# Patient Record
Sex: MALE | Race: WHITE | Employment: FULL TIME | URBAN - METROPOLITAN AREA
[De-identification: names, ages, dates, MRNs, and addresses within clinical notes are randomized per-mention and may not be internally consistent; named-entity substitution may affect disease eponyms.]

---

## 2017-12-23 ENCOUNTER — HOSPITAL ENCOUNTER (EMERGENCY)
Age: 42
Discharge: HOME | End: 2017-12-23
Attending: EMERGENCY MEDICINE
Payer: COMMERCIAL

## 2017-12-23 ENCOUNTER — APPOINTMENT (OUTPATIENT)
Dept: CT IMAGING | Age: 42
End: 2017-12-23
Attending: PHYSICIAN ASSISTANT
Payer: COMMERCIAL

## 2017-12-23 VITALS
DIASTOLIC BLOOD PRESSURE: 72 MMHG | SYSTOLIC BLOOD PRESSURE: 112 MMHG | TEMPERATURE: 97.5 F | RESPIRATION RATE: 16 BRPM | HEART RATE: 82 BPM | OXYGEN SATURATION: 98 %

## 2017-12-23 DIAGNOSIS — G43.809 OTHER MIGRAINE WITHOUT STATUS MIGRAINOSUS, NOT INTRACTABLE: Primary | ICD-10-CM

## 2017-12-23 DIAGNOSIS — R11.2 NON-INTRACTABLE VOMITING WITH NAUSEA, UNSPECIFIED VOMITING TYPE: ICD-10-CM

## 2017-12-23 LAB
ANION GAP SERPL CALC-SCNC: 11 MMOL/L (ref 3–18)
BASOPHILS # BLD: 0 K/UL (ref 0–0.06)
BASOPHILS NFR BLD: 1 % (ref 0–2)
BUN SERPL-MCNC: 16 MG/DL (ref 7–18)
BUN/CREAT SERPL: 16 (ref 12–20)
CALCIUM SERPL-MCNC: 7.9 MG/DL (ref 8.5–10.1)
CHLORIDE SERPL-SCNC: 112 MMOL/L (ref 100–108)
CO2 SERPL-SCNC: 22 MMOL/L (ref 21–32)
CREAT SERPL-MCNC: 1.03 MG/DL (ref 0.6–1.3)
DIFFERENTIAL METHOD BLD: NORMAL
EOSINOPHIL # BLD: 0.1 K/UL (ref 0–0.4)
EOSINOPHIL NFR BLD: 2 % (ref 0–5)
ERYTHROCYTE [DISTWIDTH] IN BLOOD BY AUTOMATED COUNT: 11.9 % (ref 11.6–14.5)
GLUCOSE SERPL-MCNC: 164 MG/DL (ref 74–99)
HCT VFR BLD AUTO: 41.4 % (ref 36–48)
HGB BLD-MCNC: 14.4 G/DL (ref 13–16)
LYMPHOCYTES # BLD: 1.9 K/UL (ref 0.9–3.6)
LYMPHOCYTES NFR BLD: 37 % (ref 21–52)
MCH RBC QN AUTO: 30.4 PG (ref 24–34)
MCHC RBC AUTO-ENTMCNC: 34.8 G/DL (ref 31–37)
MCV RBC AUTO: 87.3 FL (ref 74–97)
MONOCYTES # BLD: 0.5 K/UL (ref 0.05–1.2)
MONOCYTES NFR BLD: 10 % (ref 3–10)
NEUTS SEG # BLD: 2.6 K/UL (ref 1.8–8)
NEUTS SEG NFR BLD: 50 % (ref 40–73)
PLATELET # BLD AUTO: 178 K/UL (ref 135–420)
PMV BLD AUTO: 9.7 FL (ref 9.2–11.8)
POTASSIUM SERPL-SCNC: 3.6 MMOL/L (ref 3.5–5.5)
RBC # BLD AUTO: 4.74 M/UL (ref 4.7–5.5)
SODIUM SERPL-SCNC: 145 MMOL/L (ref 136–145)
WBC # BLD AUTO: 5.1 K/UL (ref 4.6–13.2)

## 2017-12-23 PROCEDURE — 80048 BASIC METABOLIC PNL TOTAL CA: CPT

## 2017-12-23 PROCEDURE — 96361 HYDRATE IV INFUSION ADD-ON: CPT

## 2017-12-23 PROCEDURE — 70450 CT HEAD/BRAIN W/O DYE: CPT

## 2017-12-23 PROCEDURE — 85025 COMPLETE CBC W/AUTO DIFF WBC: CPT

## 2017-12-23 PROCEDURE — 74011250636 HC RX REV CODE- 250/636

## 2017-12-23 PROCEDURE — 96374 THER/PROPH/DIAG INJ IV PUSH: CPT

## 2017-12-23 PROCEDURE — 74011250636 HC RX REV CODE- 250/636: Performed by: PHYSICIAN ASSISTANT

## 2017-12-23 PROCEDURE — 99284 EMERGENCY DEPT VISIT MOD MDM: CPT

## 2017-12-23 PROCEDURE — 96375 TX/PRO/DX INJ NEW DRUG ADDON: CPT

## 2017-12-23 RX ORDER — METOCLOPRAMIDE HYDROCHLORIDE 5 MG/ML
5 INJECTION INTRAMUSCULAR; INTRAVENOUS EVERY 6 HOURS
Status: DISCONTINUED | OUTPATIENT
Start: 2017-12-23 | End: 2017-12-23 | Stop reason: HOSPADM

## 2017-12-23 RX ORDER — ONDANSETRON 2 MG/ML
INJECTION INTRAMUSCULAR; INTRAVENOUS
Status: COMPLETED
Start: 2017-12-23 | End: 2017-12-23

## 2017-12-23 RX ORDER — BUTALBITAL, ACETAMINOPHEN AND CAFFEINE 300; 40; 50 MG/1; MG/1; MG/1
1 CAPSULE ORAL
Qty: 10 CAP | Refills: 0 | Status: SHIPPED | OUTPATIENT
Start: 2017-12-23 | End: 2018-09-17

## 2017-12-23 RX ORDER — KETOROLAC TROMETHAMINE 30 MG/ML
30 INJECTION, SOLUTION INTRAMUSCULAR; INTRAVENOUS
Status: COMPLETED | OUTPATIENT
Start: 2017-12-23 | End: 2017-12-23

## 2017-12-23 RX ORDER — DIPHENHYDRAMINE HYDROCHLORIDE 50 MG/ML
12.5 INJECTION, SOLUTION INTRAMUSCULAR; INTRAVENOUS
Status: COMPLETED | OUTPATIENT
Start: 2017-12-23 | End: 2017-12-23

## 2017-12-23 RX ORDER — HALOPERIDOL 5 MG/ML
3 INJECTION INTRAMUSCULAR
Status: COMPLETED | OUTPATIENT
Start: 2017-12-23 | End: 2017-12-23

## 2017-12-23 RX ORDER — ONDANSETRON 2 MG/ML
4 INJECTION INTRAMUSCULAR; INTRAVENOUS
Status: COMPLETED | OUTPATIENT
Start: 2017-12-23 | End: 2017-12-23

## 2017-12-23 RX ORDER — METOCLOPRAMIDE HYDROCHLORIDE 5 MG/ML
5 INJECTION INTRAMUSCULAR; INTRAVENOUS EVERY 6 HOURS
Status: DISCONTINUED | OUTPATIENT
Start: 2017-12-23 | End: 2017-12-23

## 2017-12-23 RX ORDER — PROMETHAZINE HYDROCHLORIDE 25 MG/1
25 TABLET ORAL
Qty: 12 TAB | Refills: 0 | Status: SHIPPED | OUTPATIENT
Start: 2017-12-23 | End: 2018-09-17

## 2017-12-23 RX ADMIN — ONDANSETRON 4 MG: 2 INJECTION INTRAMUSCULAR; INTRAVENOUS at 09:23

## 2017-12-23 RX ADMIN — DIPHENHYDRAMINE HYDROCHLORIDE 12.5 MG: 50 INJECTION INTRAMUSCULAR; INTRAVENOUS at 09:40

## 2017-12-23 RX ADMIN — HALOPERIDOL LACTATE 3 MG: 5 INJECTION, SOLUTION INTRAMUSCULAR at 10:46

## 2017-12-23 RX ADMIN — SODIUM CHLORIDE 1000 ML: 900 INJECTION, SOLUTION INTRAVENOUS at 09:40

## 2017-12-23 RX ADMIN — METOCLOPRAMIDE 5 MG: 5 INJECTION, SOLUTION INTRAMUSCULAR; INTRAVENOUS at 10:06

## 2017-12-23 RX ADMIN — ONDANSETRON 4 MG: 2 SOLUTION INTRAMUSCULAR; INTRAVENOUS at 09:23

## 2017-12-23 RX ADMIN — KETOROLAC TROMETHAMINE 30 MG: 30 INJECTION, SOLUTION INTRAMUSCULAR; INTRAVENOUS at 09:40

## 2017-12-23 NOTE — DISCHARGE INSTRUCTIONS
Migraine Headache: Care Instructions  Your Care Instructions  Migraines are painful, throbbing headaches that often start on one side of the head. They may cause nausea and vomiting and make you sensitive to light, sound, or smell. Without treatment, migraines can last from 4 hours to a few days. Medicines can help prevent migraines or stop them after they have started. Your doctor can help you find which ones work best for you. Follow-up care is a key part of your treatment and safety. Be sure to make and go to all appointments, and call your doctor if you are having problems. It's also a good idea to know your test results and keep a list of the medicines you take. How can you care for yourself at home? · Do not drive if you have taken a prescription pain medicine. · Rest in a quiet, dark room until your headache is gone. Close your eyes, and try to relax or go to sleep. Don't watch TV or read. · Put a cold, moist cloth or cold pack on the painful area for 10 to 20 minutes at a time. Put a thin cloth between the cold pack and your skin. · Use a warm, moist towel or a heating pad set on low to relax tight shoulder and neck muscles. · Have someone gently massage your neck and shoulders. · Take your medicines exactly as prescribed. Call your doctor if you think you are having a problem with your medicine. You will get more details on the specific medicines your doctor prescribes. · Be careful not to take pain medicine more often than the instructions allow. You could get worse or more frequent headaches when the medicine wears off. To prevent migraines  · Keep a headache diary so you can figure out what triggers your headaches. Avoiding triggers may help you prevent headaches. Record when each headache began, how long it lasted, and what the pain was like.  (Was it throbbing, aching, stabbing, or dull?) Write down any other symptoms you had with the headache, such as nausea, flashing lights or dark spots, or sensitivity to bright light or loud noise. Note if the headache occurred near your period. List anything that might have triggered the headache. Triggers may include certain foods (chocolate, cheese, wine) or odors, smoke, bright light, stress, or lack of sleep. · If your doctor has prescribed medicine for your migraines, take it as directed. You may have medicine that you take only when you get a migraine and medicine that you take all the time to help prevent migraines. ¨ If your doctor has prescribed medicine for when you get a headache, take it at the first sign of a migraine, unless your doctor has given you other instructions. ¨ If your doctor has prescribed medicine to prevent migraines, take it exactly as prescribed. Call your doctor if you think you are having a problem with your medicine. · Find healthy ways to deal with stress. Migraines are most common during or right after stressful times. Take time to relax before and after you do something that has caused a migraine in the past.  · Try to keep your muscles relaxed by keeping good posture. Check your jaw, face, neck, and shoulder muscles for tension. Try to relax them. When you sit at a desk, change positions often. And make sure to stretch for 30 seconds each hour. · Get plenty of sleep and exercise. · Eat meals on a regular schedule. Avoid foods and drinks that often trigger migraines. These include chocolate, alcohol (especially red wine and port), aspartame, monosodium glutamate (MSG), and some additives found in foods (such as hot dogs, gonsalves, cold cuts, aged cheeses, and pickled foods). · Limit caffeine. Don't drink too much coffee, tea, or soda. But don't quit caffeine suddenly. That can also give you migraines. · Do not smoke or allow others to smoke around you. If you need help quitting, talk to your doctor about stop-smoking programs and medicines. These can increase your chances of quitting for good.   · If you are taking birth control pills or hormone therapy, talk to your doctor about whether they are triggering your migraines. When should you call for help? Call 911 anytime you think you may need emergency care. For example, call if:  ? · You have signs of a stroke. These may include:  ¨ Sudden numbness, paralysis, or weakness in your face, arm, or leg, especially on only one side of your body. ¨ Sudden vision changes. ¨ Sudden trouble speaking. ¨ Sudden confusion or trouble understanding simple statements. ¨ Sudden problems with walking or balance. ¨ A sudden, severe headache that is different from past headaches. ?Call your doctor now or seek immediate medical care if:  ? · You have new or worse nausea and vomiting. ? · You have a new or higher fever. ? · Your headache gets much worse. ? Watch closely for changes in your health, and be sure to contact your doctor if:  ? · You are not getting better after 2 days (48 hours). Where can you learn more? Go to http://hiral-brian.info/. Enter V215 in the search box to learn more about \"Migraine Headache: Care Instructions. \"  Current as of: October 14, 2016  Content Version: 11.4  © 3800-7353 [employer]. Care instructions adapted under license by [employer] (which disclaims liability or warranty for this information). If you have questions about a medical condition or this instruction, always ask your healthcare professional. [place] [address] any warranty or liability for your use of this information.

## 2017-12-23 NOTE — ED PROVIDER NOTES
HPI Comments: Patient is a 39 y/o male w/ PMH HTN, kidney stones, Vit D deficiency, Migraines, who presents to the ER via EMS c/o severe headache, nausea and vomiting. Patient states he woke up around 0800 this morning with these symptoms. He has had a previous hx of migraines in the past, however states his headache today feels worse. He does admit to drinking \"some glasses of wine\" last night, which is not unusual for him. Patient denied any recent sick contacts. He denied any fevers, chills, neck stiffness, dizziness, change in vision, SOB, chest pain, abd pain, diarrhea, and has no other complaints. Patient is a 40 y.o. male presenting with migraines, nausea, and vomiting. The history is provided by the patient. Migraine    Associated symptoms include nausea and vomiting. Pertinent negatives include no fever, no palpitations, no shortness of breath, no weakness and no dizziness. Nausea    Associated symptoms include headaches and headaches. Pertinent negatives include no chills, no fever, no abdominal pain and no cough. Vomiting    Associated symptoms include headaches and headaches. Pertinent negatives include no chills, no fever, no abdominal pain and no cough.         Past Medical History:   Diagnosis Date    Acute sinusitis, unspecified     Acute upper respiratory infections of unspecified site     Asthma     Calculus of kidney     Herpes simplex without mention of complication     History of herpes genitalis     Hypertension     Hypogonadism in male     Insomnia, unspecified     Lesion of plantar nerve     Mixed hypertriglyceridemia     Morbid obesity (HCC)     Obesity     Osteoarthritis     Other malaise and fatigue     Pain in joint, lower leg     Scrotal varices     Sleep apnea     Spermatocele     Tachycardia, unspecified     Testicular hypofunction     Unspecified arthropathy, lower leg     Unspecified essential hypertension     Varicocele     small, Left    Vitamin D deficiency        Past Surgical History:   Procedure Laterality Date    HX CHOLECYSTECTOMY  2005    HX COLONOSCOPY      HX GASTRECTOMY  08/24/2015    Gastric Sleeve    HX OTHER SURGICAL  08/24/2015    EGD    HX SEPTOPLASTY  2003    HX TONSILLECTOMY  2003         Family History:   Problem Relation Age of Onset    Hypertension Mother     Heart Disease Father      Afib    Diabetes Father     Heart Disease Paternal Grandmother      Afib       Social History     Social History    Marital status: SINGLE     Spouse name: N/A    Number of children: N/A    Years of education: N/A     Occupational History          Social History Main Topics    Smoking status: Never Smoker    Smokeless tobacco: Never Used    Alcohol use 0.0 oz/week     0 Standard drinks or equivalent per week      Comment: Socially    Drug use: No    Sexual activity: Not on file     Other Topics Concern    Not on file     Social History Narrative         ALLERGIES: Morphine    Review of Systems   Constitutional: Negative for chills, fatigue and fever. HENT: Negative. Negative for sore throat. Eyes: Negative. Respiratory: Negative for cough and shortness of breath. Cardiovascular: Negative for chest pain and palpitations. Gastrointestinal: Positive for nausea and vomiting. Negative for abdominal pain. Genitourinary: Negative for dysuria. Musculoskeletal: Negative. Skin: Negative. Neurological: Positive for headaches. Negative for dizziness, weakness and light-headedness. Psychiatric/Behavioral: Negative. All other systems reviewed and are negative. Vitals:    12/23/17 0927 12/23/17 1037   BP: 112/72    Pulse: 82    Resp: 16    Temp:  97.5 °F (36.4 °C)   SpO2: 98%             Physical Exam   Constitutional: He is oriented to person, place, and time. He appears well-developed and well-nourished. He appears distressed. HENT:   Head: Normocephalic and atraumatic.  Head is without raccoon's eyes and without Haddad's sign. Nose: Nose normal.   Mouth/Throat: Uvula is midline, oropharynx is clear and moist and mucous membranes are normal.   Eyes: Conjunctivae and EOM are normal. Pupils are equal, round, and reactive to light. No scleral icterus. Neck: Normal range of motion. Neck supple. No JVD present. No tracheal deviation present. Cardiovascular: Normal rate, regular rhythm and normal heart sounds. Pulmonary/Chest: Effort normal and breath sounds normal. No respiratory distress. He has no wheezes. Abdominal: Soft. Bowel sounds are normal. He exhibits no distension. There is no tenderness. There is no rebound and no guarding. Musculoskeletal: Normal range of motion. Neurological: He is alert and oriented to person, place, and time. He has normal strength. He displays a negative Romberg sign. Gait normal. GCS eye subscore is 4. GCS verbal subscore is 5. GCS motor subscore is 6.   CN 2-12 grossly in tact; speech clear   Skin: Skin is warm and dry. He is not diaphoretic. Psychiatric: He has a normal mood and affect. Nursing note and vitals reviewed. MDM  Number of Diagnoses or Management Options  Non-intractable vomiting with nausea, unspecified vomiting type: Other migraine without status migrainosus, not intractable:   Diagnosis management comments: 12:09 PM  41 y/o male brought in by EMS for headache, N/V onset this morning when he woke up. Previous hx of migraines; also admits to drinking alcohol last night. NVI intact. Stroke screen negative. Pt with persistent vomiting after zofran and reglan. Much improved with haldol. Pt resting at this time. Labs reviewed. Noted to have decreased calcium. CT head negative. Pt states feeling somewhat better. Will plan on recheck and anticipate discharge. Christian Mittal PA-C    1:13 PM  Pt feeling better after fluids, meds. Nausea/vomiting noted to improve. Discussed all results with pt and family member at bedside.   Pt stable for discharge at this time. All questions answered and patient in agreement with plan of care. Will plan for discharge. Katie Rodriguez PA-C      Clinical Impression:  MIgraine, N/V         Amount and/or Complexity of Data Reviewed  Clinical lab tests: ordered and reviewed  Tests in the radiology section of CPT®: reviewed and ordered    Risk of Complications, Morbidity, and/or Mortality  Presenting problems: moderate  Diagnostic procedures: moderate  Management options: moderate    Patient Progress  Patient progress: stable    ED Course       Procedures        Vitals:  Patient Vitals for the past 12 hrs:   Temp Pulse Resp BP SpO2   12/23/17 1037 97.5 °F (36.4 °C) - - - -   12/23/17 0927 - 82 16 112/72 98 %         Medications ordered:   Medications   metoclopramide HCl (REGLAN) injection 5 mg (5 mg IntraVENous Given 12/23/17 1006)   ketorolac (TORADOL) injection 30 mg (30 mg IntraVENous Given 12/23/17 0940)   diphenhydrAMINE (BENADRYL) injection 12.5 mg (12.5 mg IntraVENous Given 12/23/17 0940)   ondansetron (ZOFRAN) injection 4 mg (4 mg IntraVENous Given 12/23/17 0923)   sodium chloride 0.9 % bolus infusion 1,000 mL (1,000 mL IntraVENous New Bag 12/23/17 0940)   haloperidol lactate (HALDOL) injection 3 mg (3 mg IntraVENous Given 12/23/17 1046)         Lab findings:  Recent Results (from the past 12 hour(s))   CBC WITH AUTOMATED DIFF    Collection Time: 12/23/17  9:40 AM   Result Value Ref Range    WBC 5.1 4.6 - 13.2 K/uL    RBC 4.74 4.70 - 5.50 M/uL    HGB 14.4 13.0 - 16.0 g/dL    HCT 41.4 36.0 - 48.0 %    MCV 87.3 74.0 - 97.0 FL    MCH 30.4 24.0 - 34.0 PG    MCHC 34.8 31.0 - 37.0 g/dL    RDW 11.9 11.6 - 14.5 %    PLATELET 846 490 - 241 K/uL    MPV 9.7 9.2 - 11.8 FL    NEUTROPHILS 50 40 - 73 %    LYMPHOCYTES 37 21 - 52 %    MONOCYTES 10 3 - 10 %    EOSINOPHILS 2 0 - 5 %    BASOPHILS 1 0 - 2 %    ABS. NEUTROPHILS 2.6 1.8 - 8.0 K/UL    ABS. LYMPHOCYTES 1.9 0.9 - 3.6 K/UL    ABS. MONOCYTES 0.5 0.05 - 1.2 K/UL    ABS. EOSINOPHILS 0.1 0.0 - 0.4 K/UL    ABS. BASOPHILS 0.0 0.0 - 0.06 K/UL    DF AUTOMATED     METABOLIC PANEL, BASIC    Collection Time: 12/23/17  9:40 AM   Result Value Ref Range    Sodium 145 136 - 145 mmol/L    Potassium 3.6 3.5 - 5.5 mmol/L    Chloride 112 (H) 100 - 108 mmol/L    CO2 22 21 - 32 mmol/L    Anion gap 11 3.0 - 18 mmol/L    Glucose 164 (H) 74 - 99 mg/dL    BUN 16 7.0 - 18 MG/DL    Creatinine 1.03 0.6 - 1.3 MG/DL    BUN/Creatinine ratio 16 12 - 20      GFR est AA >60 >60 ml/min/1.73m2    GFR est non-AA >60 >60 ml/min/1.73m2    Calcium 7.9 (L) 8.5 - 10.1 MG/DL       EKG interpretation by ED Physician:      X-Ray, CT or other radiology findings or impressions:  CT HEAD WO CONT   Final Result          Progress notes, Consult notes or additional Procedure notes:       Reevaluation of patient:       Disposition:  Diagnosis:   1. Other migraine without status migrainosus, not intractable    2. Non-intractable vomiting with nausea, unspecified vomiting type        Disposition: Discharged    Follow-up Information     Follow up With Details Comments Contact Info    [facility] EM[facility]ENC[facility]PT  If symptoms worsen [address] [place] [address] [address] [facility]    Michael Courtney MD Call in 1 week As needed for ER follow up [facility]  [phone]             Patient's Medications   Start Taking    BUTALBITAL-ACETAMINOPHEN-CAFF (FIORICET) -40 MG PER CAPSULE    Take 1 Cap by mouth every four (4) hours as needed for Headache. Max Daily Amount: 6 Caps. Indications: Migraine    PROMETHAZINE (PHENERGAN) 25 MG TABLET    Take 1 Tab by mouth every eight (8) hours as needed. Continue Taking    ASPIRIN DELAYED-RELEASE 81 MG TABLET    Take  by mouth daily. CITALOPRAM (CELEXA) 20 MG TABLET    Take  by mouth daily. CLOMIPHENE (CLOMID) 50 MG TABLET    Take 0.5 Tabs by mouth every other day. FEXOFENADINE (ALLEGRA) 60 MG TABLET    Take  by mouth daily.     KRILL/OM3/DHA/EPA/OM6/LIP/ASTX (KRILL OIL, OMEGA 3 & 6, PO)    Take  by mouth.    These Medications have changed    No medications on file   Stop Taking    No medications on file

## 2017-12-23 NOTE — ED TRIAGE NOTES
Pt brought via medic from home for migraine, accompanied w/ N&V. Pt has a HX of migraines. Pt states he did drink some wine last night. Pt currently having sensitivty to light. Pt denies SOB and Chest Pain.

## 2018-01-18 ENCOUNTER — HOSPITAL ENCOUNTER (OUTPATIENT)
Dept: LAB | Age: 43
Discharge: HOME | End: 2018-01-18
Payer: COMMERCIAL

## 2018-01-18 LAB
ASPIRIN TEST, ASPIRN: 539 ARU (ref 620–672)
P2Y12 PLT RESPONSE,PPPR: 234 PRU (ref 194–418)

## 2018-01-18 PROCEDURE — 85576 BLOOD PLATELET AGGREGATION: CPT | Performed by: PSYCHIATRY & NEUROLOGY

## 2018-01-18 PROCEDURE — 36415 COLL VENOUS BLD VENIPUNCTURE: CPT | Performed by: PSYCHIATRY & NEUROLOGY

## 2024-12-23 ENCOUNTER — HOSPITAL ENCOUNTER (OUTPATIENT)
Facility: HOSPITAL | Age: 49
Setting detail: RECURRING SERIES
Discharge: HOME | End: 2024-12-26
Payer: COMMERCIAL

## 2024-12-23 PROCEDURE — 97161 PT EVAL LOW COMPLEX 20 MIN: CPT

## 2024-12-23 NOTE — PROGRESS NOTES
PHYSICAL / OCCUPATIONAL THERAPY - DAILY TREATMENT NOTE    Patient Name: Benjamin Jacobson    Date: 2024    : 1975  Insurance: Payor: [place] / Plan: [place] / Product Type: *No Product type* /      Patient  verified Yes     Visit #   Current / Total 1 10   Time   In / Out 1130 1210   Pain   In / Out 0 0   Subjective Functional Status/Changes: See POC     TREATMENT AREA =  Pain in left shoulder [M25.512]    OBJECTIVE    40 min [x]Eval - untimed                             Therapeutic Procedures:    Tx Min Billable or 1:1 Min (if diff from Tx Min) Procedure, Rationale, Specifics          Details if applicable:              Details if applicable:            Details if applicable:            Details if applicable:            Details if applicable:       [facility] Totals Reminder: bill using total billable min of TIMED therapeutic procedures (example: do not include dry needle or estim unattended, both untimed codes, in totals to left)  8-22 min = 1 unit; 23-37 min = 2 units; 38-52 min = 3 units; 53-67 min = 4 units; 68-82 min = 5 units   Total Total     [x]  Patient Education billed concurrently with other procedures   [x] Review HEP    [] Progressed/Changed HEP, detail:    [] Other detail:       Objective Information/Functional Measures/Assessment    See POC    Patient will continue to benefit from skilled PT / OT services to modify and progress therapeutic interventions, analyze and address functional mobility deficits, analyze and address ROM deficits, analyze and address strength deficits, analyze and address soft tissue restrictions, analyze and cue for proper movement patterns, analyze and modify for postural abnormalities, analyze and address imbalance/dizziness, and instruct in home and community integration to address functional deficits and attain remaining goals.    Progress toward goals / Updated goals:  []  See Progress Note/Recertification    See POC    Next PN/ RC due 25  Auth 
home-management.  Eval: 87 degrees pain     5. Pt will improve functional IR to at least T8 to improve their ability to reach behind their back for ADLs/IADLs.    Eval: L1      Frequency / Duration: Patient would benefit from skilled PT 1-2 times per week for 24 VISITS sessions as needed in this certification period.  Goals will be assigned and reassessed every 10 visits/ 30 days per Medicare guidelines    Patient/ Caregiver education and instruction: Diagnosis, prognosis, self care, activity modification, and exercises [x]  Plan of care has been reviewed with PTA    Certification Period: 12/23/24 - 1/21/25    Harrison Quan, PT       12/23/2024       11:27 AM  ===================================================================  I certify that the above Therapy Services are being furnished while the patient is under my care. I agree with the treatment plan and certify that this therapy is necessary.    Physician's Signature:_________________________   DATE:_________   TIME:________                           Damien Rachel MD    ** Signature, Date and Time must be completed for valid certification **  Please sign and return to [facility] [address]  [address]  [place] [address] [phone] fax

## 2025-01-03 ENCOUNTER — APPOINTMENT (OUTPATIENT)
Facility: HOSPITAL | Age: 50
End: 2025-01-03
Payer: COMMERCIAL

## 2025-01-06 ENCOUNTER — HOSPITAL ENCOUNTER (OUTPATIENT)
Facility: HOSPITAL | Age: 50
Setting detail: RECURRING SERIES
Discharge: HOME | End: 2025-01-09
Payer: COMMERCIAL

## 2025-01-06 PROCEDURE — 97112 NEUROMUSCULAR REEDUCATION: CPT

## 2025-01-06 PROCEDURE — 97530 THERAPEUTIC ACTIVITIES: CPT

## 2025-01-06 PROCEDURE — 97110 THERAPEUTIC EXERCISES: CPT

## 2025-01-06 NOTE — PROGRESS NOTES
PHYSICAL / OCCUPATIONAL THERAPY - DAILY TREATMENT NOTE    Patient Name: Benjamin Jacobson    Date: 2025    : 1975  Insurance: Payor: [place] / Plan: [employer] [place] [employer] / Product Type: *No Product type* /      Patient  verified Yes     Visit #   Current / Total 2 10   Time   In / Out 1135 1213   Pain   In / Out 0 0   Subjective Functional Status/Changes: Patient reports up until last night the pain has been pretty much gone but he slept on it wrong.     TREATMENT AREA =  Pain in left shoulder [M25.512]     OBJECTIVE      Therapeutic Procedures:    Tx Min Billable or 1:1 Min (if diff from Tx Min) Procedure, Rationale, Specifics   14  55399 Therapeutic Exercise (timed):  increase ROM, strength, coordination, balance, and proprioception to improve patient's ability to progress to PLOF and address remaining functional goals. (see flow sheet as applicable)     Details if applicable:       10  83274 Neuromuscular Re-Education (timed):  improve balance, coordination, kinesthetic sense, posture, core stability and proprioception to improve patient's ability to develop conscious control of individual muscles and awareness of position of extremities in order to progress to PLOF and address remaining functional goals. (see flow sheet as applicable)     Details if applicable:  scapular re-ed   14  67535 Therapeutic Activity (timed):  use of dynamic activities replicating functional movements to increase ROM, strength, coordination, balance, and proprioception in order to improve patient's ability to progress to PLOF and address remaining functional goals.  (see flow sheet as applicable)     Details if applicable:  functional reaching   38  [facility] Totals Reminder: bill using total billable min of TIMED therapeutic procedures (example: do not include dry needle or estim unattended, both untimed codes, in totals to left)  8-22 min = 1 unit; 23-37 min = 2 units; 38-52 min = 3 units; 53-67 min = 4 units; 68-82 min = 5

## 2025-01-08 ENCOUNTER — HOSPITAL ENCOUNTER (OUTPATIENT)
Facility: HOSPITAL | Age: 50
Setting detail: RECURRING SERIES
End: 2025-01-08
Payer: COMMERCIAL

## 2025-01-10 ENCOUNTER — HOSPITAL ENCOUNTER (OUTPATIENT)
Facility: HOSPITAL | Age: 50
Setting detail: RECURRING SERIES
Discharge: HOME | End: 2025-01-13
Payer: COMMERCIAL

## 2025-01-10 PROCEDURE — 97110 THERAPEUTIC EXERCISES: CPT

## 2025-01-10 PROCEDURE — 97112 NEUROMUSCULAR REEDUCATION: CPT

## 2025-01-10 NOTE — PROGRESS NOTES
ADLs/IADLs.               Eval: L1  Current: 1/10/2025, FIR to T11      Next PN/ RC due 01/21/25  Auth due (visit number/ date) TOMAS      PLAN  - Continue Plan of Care    PRAVIN CANNON PTA    1/10/2025    8:49 AM  If an interpreting service was utilized for treatment of this patient, the contents of this document represent the material reviewed with the patient via the .     Future Appointments   Date Time Provider Department Center   1/10/2025 12:10 PM Pravin Cannon PTA [facility]   1/13/2025 10:10 AM Anyi Noriega, PT [facility]   1/15/2025  2:10 [facility]   1/20/2025  1:30 PM Anyi Noriega, PT [facility]

## 2025-01-13 ENCOUNTER — HOSPITAL ENCOUNTER (OUTPATIENT)
Facility: HOSPITAL | Age: 50
Setting detail: RECURRING SERIES
Discharge: HOME | End: 2025-01-16
Payer: COMMERCIAL

## 2025-01-13 PROCEDURE — 97530 THERAPEUTIC ACTIVITIES: CPT

## 2025-01-13 PROCEDURE — 97112 NEUROMUSCULAR REEDUCATION: CPT

## 2025-01-13 PROCEDURE — 97110 THERAPEUTIC EXERCISES: CPT

## 2025-01-13 NOTE — PROGRESS NOTES
Details if applicable:  functional reaching    38 38 [facility] Totals Reminder: bill using total billable min of TIMED therapeutic procedures (example: do not include dry needle or estim unattended, both untimed codes, in totals to left)  8-22 min = 1 unit; 23-37 min = 2 units; 38-52 min = 3 units; 53-67 min = 4 units; 68-82 min = 5 units   Total Total     [x]  Patient Education billed concurrently with other procedures   [x] Review HEP    [] Progressed/Changed HEP, detail:    [] Other detail:       Objective Information/Functional Measures/Assessment    Patient participated in today's session without adverse effect. Remains limited in AROM in L vs RUE, but progressing in flex AROM since IE. Tactile cuing for scap retraction with row/ext for scapular mm recruitment. Reports post L shoulder pain with doorway stretch and no stretch through pec mm/chest; held 2/2 pain, but noted limited mobility into ER/horiz abd at 90. Noted limitations in thoracic mobility with extensions and open books; may consider T/s PA in future sessions as appropriate. Progress as able.       Patient will continue to benefit from skilled PT / OT services to modify and progress therapeutic interventions, analyze and address functional mobility deficits, analyze and address ROM deficits, analyze and address strength deficits, analyze and address soft tissue restrictions, analyze and cue for proper movement patterns, analyze and modify for postural abnormalities, and instruct in home and community integration to address functional deficits and attain remaining goals.    Progress toward goals / Updated goals:  []  See Progress Note/Recertification    Short Term Goals: To be accomplished in 2 weeks  Pt will be able to report a 5/10 pain rating at worst to improve patient's ability to tolerate prolonged functional activities at home. (Initial STG)              Eval:7/10 at worst prior to injection               Reports he has been mostly pain free

## 2025-01-15 ENCOUNTER — HOSPITAL ENCOUNTER (OUTPATIENT)
Facility: HOSPITAL | Age: 50
Setting detail: RECURRING SERIES
Discharge: HOME | End: 2025-01-18
Payer: COMMERCIAL

## 2025-01-15 PROCEDURE — 97112 NEUROMUSCULAR REEDUCATION: CPT

## 2025-01-15 PROCEDURE — 97530 THERAPEUTIC ACTIVITIES: CPT

## 2025-01-15 PROCEDURE — 97110 THERAPEUTIC EXERCISES: CPT

## 2025-01-15 NOTE — PROGRESS NOTES
PHYSICAL / OCCUPATIONAL THERAPY - DAILY TREATMENT NOTE    Patient Name: Benjamin Jacobson    Date: 1/15/2025    : 1975  Insurance: Payor: [place] / Plan: [place] / Product Type: *No Product type* /      Patient  verified Yes     Visit #   Current / Total 5 10   Time   In / Out 2:16 2:54   Pain   In / Out 0 0   Subjective Functional Status/Changes: Pt states he feels his ROM is better since starting therapy.      TREATMENT AREA =  Pain in left shoulder [M25.512]     OBJECTIVE  Therapeutic Procedures:    Tx Min Billable or 1:1 Min (if diff from Tx Min) Procedure, Rationale, Specifics   10  68047 Therapeutic Exercise (timed):  increase ROM, strength, coordination, balance, and proprioception to improve patient's ability to progress to PLOF and address remaining functional goals. (see flow sheet as applicable)     Details if applicable:       20  45607 Neuromuscular Re-Education (timed):  improve balance, coordination, kinesthetic sense, posture, core stability and proprioception to improve patient's ability to develop conscious control of individual muscles and awareness of position of extremities in order to progress to PLOF and address remaining functional goals. (see flow sheet as applicable)     Details if applicable:    8  78988 Therapeutic Activity (timed):  use of dynamic activities replicating functional movements to increase ROM, strength, coordination, balance, and proprioception in order to improve patient's ability to progress to PLOF and address remaining functional goals.  (see flow sheet as applicable)     Details if applicable:   38  [facility] Totals Reminder: bill using total billable min of TIMED therapeutic procedures (example: do not include dry needle or estim unattended, both untimed codes, in totals to left)  8-22 min = 1 unit; 23-37 min = 2 units; 38-52 min = 3 units; 53-67 min = 4 units; 68-82 min = 5 units   Total Total     [x]  Patient Education billed concurrently with other

## 2025-01-20 ENCOUNTER — HOSPITAL ENCOUNTER (OUTPATIENT)
Facility: HOSPITAL | Age: 50
Setting detail: RECURRING SERIES
Discharge: HOME | End: 2025-01-23
Payer: COMMERCIAL

## 2025-01-20 PROCEDURE — 97530 THERAPEUTIC ACTIVITIES: CPT

## 2025-01-20 PROCEDURE — 97110 THERAPEUTIC EXERCISES: CPT

## 2025-01-20 NOTE — PROGRESS NOTES
Physical Therapy Discharge Instructions      [facility] [address]  [address]  [place] [address]  [phone] fax      Patient: Benjamin Jacobson  : 1975      Continue Home Exercise Program 1-2 times per day for 3-4 weeks, then decrease to 3-4 times per week      Continue with    [x] Ice  as needed 2-3 times per day     [] Heat           Follow up with MD:     [] Upon completion of therapy     [x] As needed      Recommendations:     [x]   Return to activity with home program    []   Return to activity with the following modifications:       []Post Rehab Program    []Join Independent aquatic program     []Return to/join local gym        Additional Comments: Let us know if you have any questions!          Anyi Noriega, PT 2025 1:53 PM    
Pt will improve QuickDASH score to 15 to demonstrate improvement in patient's ability to perform unrestricted ADLs/IADLs at home & community. (Initial LTG)              Eval: 25 points              MET: 14 points, 7%      3. Pt will report compliance with home HEP at end of care to enhance carry over of fuctional gains made with skilled therapy services in order to improve quality of life. (Initial LTG)              Eval: Established first HEP at eval              MET: reports compliance, updated for d/c      4. Pt will improve left abduction AROM to at least 107 degrees to improve their ability to perform to reach out to the side for home-management.  Eval: 87 degrees pain   Current: MET, 137 degs with tightness and pain at the end range 1/15/2025              MET: 167 deg (01/20/25)      5. Pt will improve functional IR to at least T8 to improve their ability to reach behind their back for ADLs/IADLs.               Eval: L1  Current: 1/10/2025, FIR to T11              MET: T8 (01/20/25)     Next PN/ RC due NA, D/C  Auth due (visit number/ date) TOMAS    PLAN  - Discharge due to : PROGRAM COMPLETE    Anyi Noriega, PT    1/20/2025    1:33 PM  If an interpreting service was utilized for treatment of this patient, the contents of this document represent the material reviewed with the patient via the .     No future appointments.      
continue treatment with updated order from MD.      If you have any questions/comments please contact us directly at [phone].   Thank you for allowing us to assist in the care of your patient.  PTA signature  Anyi Noriega PT     Therapist Signature: Anyi Noriega PT Date: 01/20/25   Reporting Period: 12/23/24 - 01/20/25 Time: 2:00 PM

## 2025-03-23 ENCOUNTER — APPOINTMENT (OUTPATIENT)
Facility: HOSPITAL | Age: 50
DRG: 149 | End: 2025-03-23
Payer: COMMERCIAL

## 2025-03-23 ENCOUNTER — HOSPITAL ENCOUNTER (INPATIENT)
Facility: HOSPITAL | Age: 50
LOS: 1 days | Discharge: HOME | DRG: 149 | End: 2025-03-24
Attending: EMERGENCY MEDICINE | Admitting: INTERNAL MEDICINE
Payer: COMMERCIAL

## 2025-03-23 DIAGNOSIS — R42 VERTIGO: Primary | ICD-10-CM

## 2025-03-23 DIAGNOSIS — R09.89 SUSPECTED CEREBROVASCULAR ACCIDENT (CVA): ICD-10-CM

## 2025-03-23 DIAGNOSIS — I63.9 CEREBROVASCULAR ACCIDENT (CVA), UNSPECIFIED MECHANISM (HCC): ICD-10-CM

## 2025-03-23 LAB
ALBUMIN SERPL-MCNC: 3.8 G/DL (ref 3.4–5)
ALBUMIN/GLOB SERPL: 1.5 (ref 0.8–1.7)
ALP SERPL-CCNC: 73 U/L (ref 45–117)
ALT SERPL-CCNC: 24 U/L (ref 16–61)
ANION GAP SERPL CALC-SCNC: 5 MMOL/L (ref 3–18)
APPEARANCE UR: CLEAR
AST SERPL-CCNC: 20 U/L (ref 10–38)
BASOPHILS # BLD: 0.05 K/UL (ref 0–0.1)
BASOPHILS NFR BLD: 1 % (ref 0–2)
BILIRUB SERPL-MCNC: 0.3 MG/DL (ref 0.2–1)
BILIRUB UR QL: NEGATIVE
BUN SERPL-MCNC: 19 MG/DL (ref 7–18)
BUN/CREAT SERPL: 23 (ref 12–20)
CALCIUM SERPL-MCNC: 8.3 MG/DL (ref 8.5–10.1)
CHLORIDE SERPL-SCNC: 107 MMOL/L (ref 100–111)
CO2 SERPL-SCNC: 29 MMOL/L (ref 21–32)
COLOR UR: YELLOW
CREAT SERPL-MCNC: 0.82 MG/DL (ref 0.6–1.3)
DIFFERENTIAL METHOD BLD: ABNORMAL
EOSINOPHIL # BLD: 0.23 K/UL (ref 0–0.4)
EOSINOPHIL NFR BLD: 4.5 % (ref 0–5)
ERYTHROCYTE [DISTWIDTH] IN BLOOD BY AUTOMATED COUNT: 11.4 % (ref 11.6–14.5)
FLUAV RNA SPEC QL NAA+PROBE: NOT DETECTED
FLUBV RNA SPEC QL NAA+PROBE: NOT DETECTED
GLOBULIN SER CALC-MCNC: 2.5 G/DL (ref 2–4)
GLUCOSE BLD STRIP.AUTO-MCNC: 88 MG/DL (ref 70–110)
GLUCOSE SERPL-MCNC: 77 MG/DL (ref 74–99)
GLUCOSE UR STRIP.AUTO-MCNC: NEGATIVE MG/DL
HCT VFR BLD AUTO: 41.9 % (ref 36–48)
HGB BLD-MCNC: 14.3 G/DL (ref 13–16)
HGB UR QL STRIP: NEGATIVE
IMM GRANULOCYTES # BLD AUTO: 0.02 K/UL (ref 0–0.04)
IMM GRANULOCYTES NFR BLD AUTO: 0.4 % (ref 0–0.5)
INR PPP: 1 (ref 0.9–1.1)
KETONES UR QL STRIP.AUTO: ABNORMAL MG/DL
LEUKOCYTE ESTERASE UR QL STRIP.AUTO: NEGATIVE
LYMPHOCYTES # BLD: 1.95 K/UL (ref 0.9–3.6)
LYMPHOCYTES NFR BLD: 37.9 % (ref 21–52)
MCH RBC QN AUTO: 30.4 PG (ref 24–34)
MCHC RBC AUTO-ENTMCNC: 34.1 G/DL (ref 31–37)
MCV RBC AUTO: 89 FL (ref 78–100)
MONOCYTES # BLD: 0.56 K/UL (ref 0.05–1.2)
MONOCYTES NFR BLD: 10.9 % (ref 3–10)
NEUTS SEG # BLD: 2.33 K/UL (ref 1.8–8)
NEUTS SEG NFR BLD: 45.3 % (ref 40–73)
NITRITE UR QL STRIP.AUTO: NEGATIVE
NRBC # BLD: 0 K/UL (ref 0–0.01)
NRBC BLD-RTO: 0 PER 100 WBC
NT PRO BNP: 17 PG/ML (ref 0–450)
PH UR STRIP: 6.5 (ref 5–8)
PLATELET # BLD AUTO: 189 K/UL (ref 135–420)
PMV BLD AUTO: 9.2 FL (ref 9.2–11.8)
POTASSIUM SERPL-SCNC: 3.9 MMOL/L (ref 3.5–5.5)
PROT SERPL-MCNC: 6.3 G/DL (ref 6.4–8.2)
PROT UR STRIP-MCNC: NEGATIVE MG/DL
PROTHROMBIN TIME: 13.1 SEC (ref 11.9–14.9)
RBC # BLD AUTO: 4.71 M/UL (ref 4.35–5.65)
SARS-COV-2 RNA RESP QL NAA+PROBE: NOT DETECTED
SODIUM SERPL-SCNC: 141 MMOL/L (ref 136–145)
SOURCE: NORMAL
SP GR UR REFRACTOMETRY: 1.03 (ref 1–1.03)
TROPONIN I SERPL HS-MCNC: 6 NG/L (ref 0–78)
UROBILINOGEN UR QL STRIP.AUTO: 0.2 EU/DL (ref 0.2–1)
WBC # BLD AUTO: 5.1 K/UL (ref 4.6–13.2)

## 2025-03-23 PROCEDURE — 83880 ASSAY OF NATRIURETIC PEPTIDE: CPT

## 2025-03-23 PROCEDURE — 85610 PROTHROMBIN TIME: CPT

## 2025-03-23 PROCEDURE — 85025 COMPLETE CBC W/AUTO DIFF WBC: CPT

## 2025-03-23 PROCEDURE — 93005 ELECTROCARDIOGRAM TRACING: CPT | Performed by: EMERGENCY MEDICINE

## 2025-03-23 PROCEDURE — 70496 CT ANGIOGRAPHY HEAD: CPT

## 2025-03-23 PROCEDURE — 87636 SARSCOV2 & INF A&B AMP PRB: CPT

## 2025-03-23 PROCEDURE — 82962 GLUCOSE BLOOD TEST: CPT

## 2025-03-23 PROCEDURE — 6360000004 HC RX CONTRAST MEDICATION: Performed by: EMERGENCY MEDICINE

## 2025-03-23 PROCEDURE — 70450 CT HEAD/BRAIN W/O DYE: CPT

## 2025-03-23 PROCEDURE — 99285 EMERGENCY DEPT VISIT HI MDM: CPT

## 2025-03-23 PROCEDURE — 84484 ASSAY OF TROPONIN QUANT: CPT

## 2025-03-23 PROCEDURE — 80053 COMPREHEN METABOLIC PANEL: CPT

## 2025-03-23 PROCEDURE — 81003 URINALYSIS AUTO W/O SCOPE: CPT

## 2025-03-23 RX ORDER — IOPAMIDOL 755 MG/ML
100 INJECTION, SOLUTION INTRAVASCULAR
Status: COMPLETED | OUTPATIENT
Start: 2025-03-23 | End: 2025-03-23

## 2025-03-23 RX ADMIN — IOPAMIDOL 100 ML: 755 INJECTION, SOLUTION INTRAVENOUS at 21:51

## 2025-03-23 ASSESSMENT — LIFESTYLE VARIABLES
HOW MANY STANDARD DRINKS CONTAINING ALCOHOL DO YOU HAVE ON A TYPICAL DAY: 1 OR 2
HOW OFTEN DO YOU HAVE A DRINK CONTAINING ALCOHOL: 2-4 TIMES A MONTH

## 2025-03-23 ASSESSMENT — PAIN - FUNCTIONAL ASSESSMENT: PAIN_FUNCTIONAL_ASSESSMENT: NONE - DENIES PAIN

## 2025-03-24 ENCOUNTER — APPOINTMENT (OUTPATIENT)
Facility: HOSPITAL | Age: 50
DRG: 149 | End: 2025-03-24
Attending: INTERNAL MEDICINE
Payer: COMMERCIAL

## 2025-03-24 ENCOUNTER — APPOINTMENT (OUTPATIENT)
Facility: HOSPITAL | Age: 50
DRG: 149 | End: 2025-03-24
Payer: COMMERCIAL

## 2025-03-24 VITALS
OXYGEN SATURATION: 98 % | HEIGHT: 69 IN | WEIGHT: 185 LBS | DIASTOLIC BLOOD PRESSURE: 79 MMHG | TEMPERATURE: 98.1 F | HEART RATE: 84 BPM | SYSTOLIC BLOOD PRESSURE: 111 MMHG | RESPIRATION RATE: 19 BRPM | BODY MASS INDEX: 27.4 KG/M2

## 2025-03-24 PROBLEM — R09.89 SUSPECTED CEREBROVASCULAR ACCIDENT (CVA): Status: ACTIVE | Noted: 2025-03-24

## 2025-03-24 LAB
ALBUMIN SERPL-MCNC: 3.4 G/DL (ref 3.4–5)
ALBUMIN/GLOB SERPL: 1.4 (ref 0.8–1.7)
ALP SERPL-CCNC: 63 U/L (ref 45–117)
ALT SERPL-CCNC: 22 U/L (ref 16–61)
ANION GAP SERPL CALC-SCNC: 4 MMOL/L (ref 3–18)
AST SERPL-CCNC: 20 U/L (ref 10–38)
BASOPHILS # BLD: 0.03 K/UL (ref 0–0.1)
BASOPHILS NFR BLD: 0.9 % (ref 0–2)
BILIRUB SERPL-MCNC: 0.5 MG/DL (ref 0.2–1)
BUN SERPL-MCNC: 17 MG/DL (ref 7–18)
BUN/CREAT SERPL: 22 (ref 12–20)
CALCIUM SERPL-MCNC: 8.4 MG/DL (ref 8.5–10.1)
CHLORIDE SERPL-SCNC: 107 MMOL/L (ref 100–111)
CHOLEST SERPL-MCNC: 143 MG/DL
CO2 SERPL-SCNC: 27 MMOL/L (ref 21–32)
CREAT SERPL-MCNC: 0.78 MG/DL (ref 0.6–1.3)
DIFFERENTIAL METHOD BLD: ABNORMAL
ECHO AO ASC DIAM: 3 CM
ECHO AO ASCENDING AORTA INDEX: 1.5 CM/M2
ECHO AO ROOT DIAM: 3.1 CM
ECHO AO ROOT INDEX: 1.55 CM/M2
ECHO AV AREA PEAK VELOCITY: 3.1 CM2
ECHO AV AREA VTI: 3 CM2
ECHO AV AREA/BSA PEAK VELOCITY: 1.6 CM2/M2
ECHO AV AREA/BSA VTI: 1.5 CM2/M2
ECHO AV MEAN GRADIENT: 3 MMHG
ECHO AV MEAN VELOCITY: 0.8 M/S
ECHO AV PEAK GRADIENT: 6 MMHG
ECHO AV PEAK VELOCITY: 1.2 M/S
ECHO AV VELOCITY RATIO: 0.92
ECHO AV VTI: 21.6 CM
ECHO BSA: 2.02 M2
ECHO LA VOL A-L A2C: 27 ML (ref 18–58)
ECHO LA VOL A-L A4C: 32 ML (ref 18–58)
ECHO LA VOL MOD A2C: 26 ML (ref 18–58)
ECHO LA VOL MOD A4C: 29 ML (ref 18–58)
ECHO LA VOLUME AREA LENGTH: 30 ML
ECHO LA VOLUME INDEX A-L A2C: 14 ML/M2 (ref 16–34)
ECHO LA VOLUME INDEX A-L A4C: 16 ML/M2 (ref 16–34)
ECHO LA VOLUME INDEX AREA LENGTH: 15 ML/M2 (ref 16–34)
ECHO LA VOLUME INDEX MOD A2C: 13 ML/M2 (ref 16–34)
ECHO LA VOLUME INDEX MOD A4C: 15 ML/M2 (ref 16–34)
ECHO LV E' LATERAL VELOCITY: 13 CM/S
ECHO LV E' SEPTAL VELOCITY: 9 CM/S
ECHO LV EDV A2C: 87 ML
ECHO LV EDV A4C: 76 ML
ECHO LV EDV BP: 81 ML (ref 67–155)
ECHO LV EDV INDEX A4C: 38 ML/M2
ECHO LV EDV INDEX BP: 41 ML/M2
ECHO LV EDV NDEX A2C: 44 ML/M2
ECHO LV EF PHYSICIAN: 65 %
ECHO LV EJECTION FRACTION A2C: 68 %
ECHO LV EJECTION FRACTION A4C: 65 %
ECHO LV EJECTION FRACTION BIPLANE: 67 % (ref 55–100)
ECHO LV ESV A2C: 28 ML
ECHO LV ESV A4C: 26 ML
ECHO LV ESV BP: 27 ML (ref 22–58)
ECHO LV ESV INDEX A2C: 14 ML/M2
ECHO LV ESV INDEX A4C: 13 ML/M2
ECHO LV ESV INDEX BP: 14 ML/M2
ECHO LV FRACTIONAL SHORTENING: 32 % (ref 28–44)
ECHO LV INTERNAL DIMENSION DIASTOLE INDEX: 2.5 CM/M2
ECHO LV INTERNAL DIMENSION DIASTOLIC: 5 CM (ref 4.2–5.9)
ECHO LV INTERNAL DIMENSION SYSTOLIC INDEX: 1.7 CM/M2
ECHO LV INTERNAL DIMENSION SYSTOLIC: 3.4 CM
ECHO LV IVSD: 0.8 CM (ref 0.6–1)
ECHO LV MASS 2D: 135.8 G (ref 88–224)
ECHO LV MASS INDEX 2D: 67.9 G/M2 (ref 49–115)
ECHO LV POSTERIOR WALL DIASTOLIC: 0.8 CM (ref 0.6–1)
ECHO LV RELATIVE WALL THICKNESS RATIO: 0.32
ECHO LVOT AREA: 3.5 CM2
ECHO LVOT AV VTI INDEX: 0.88
ECHO LVOT DIAM: 2.1 CM
ECHO LVOT MEAN GRADIENT: 2 MMHG
ECHO LVOT PEAK GRADIENT: 4 MMHG
ECHO LVOT PEAK VELOCITY: 1.1 M/S
ECHO LVOT STROKE VOLUME INDEX: 32.9 ML/M2
ECHO LVOT SV: 65.8 ML
ECHO LVOT VTI: 19 CM
ECHO MV A VELOCITY: 0.63 M/S
ECHO MV E DECELERATION TIME (DT): 237.2 MS
ECHO MV E VELOCITY: 0.73 M/S
ECHO MV E/A RATIO: 1.16
ECHO MV E/E' LATERAL: 5.62
ECHO MV E/E' RATIO (AVERAGED): 6.86
ECHO MV E/E' SEPTAL: 8.11
ECHO PV MAX VELOCITY: 0.8 M/S
ECHO PV PEAK GRADIENT: 3 MMHG
ECHO RV BASAL DIMENSION: 3.9 CM
ECHO RV FREE WALL PEAK S': 13 CM/S
ECHO RV MID DIMENSION: 2.6 CM
ECHO RV TAPSE: 2.3 CM (ref 1.7–?)
ECHO TV REGURGITANT MAX VELOCITY: 2.4 M/S
ECHO TV REGURGITANT PEAK GRADIENT: 23 MMHG
EOSINOPHIL # BLD: 0.18 K/UL (ref 0–0.4)
EOSINOPHIL NFR BLD: 5.2 % (ref 0–5)
ERYTHROCYTE [DISTWIDTH] IN BLOOD BY AUTOMATED COUNT: 11.4 % (ref 11.6–14.5)
GLOBULIN SER CALC-MCNC: 2.4 G/DL (ref 2–4)
GLUCOSE SERPL-MCNC: 82 MG/DL (ref 74–99)
HCT VFR BLD AUTO: 39.2 % (ref 36–48)
HDLC SERPL-MCNC: 60 MG/DL (ref 40–60)
HDLC SERPL: 2.4 (ref 0–5)
HGB BLD-MCNC: 13.2 G/DL (ref 13–16)
IMM GRANULOCYTES # BLD AUTO: 0.01 K/UL (ref 0–0.04)
IMM GRANULOCYTES NFR BLD AUTO: 0.3 % (ref 0–0.5)
INR PPP: 1.1 (ref 0.9–1.1)
LDLC SERPL CALC-MCNC: 63.8 MG/DL (ref 0–100)
LIPID PANEL: NORMAL
LYMPHOCYTES # BLD: 1.13 K/UL (ref 0.9–3.6)
LYMPHOCYTES NFR BLD: 32.4 % (ref 21–52)
MCH RBC QN AUTO: 30 PG (ref 24–34)
MCHC RBC AUTO-ENTMCNC: 33.7 G/DL (ref 31–37)
MCV RBC AUTO: 89.1 FL (ref 78–100)
MONOCYTES # BLD: 0.34 K/UL (ref 0.05–1.2)
MONOCYTES NFR BLD: 9.7 % (ref 3–10)
NEUTS SEG # BLD: 1.8 K/UL (ref 1.8–8)
NEUTS SEG NFR BLD: 51.5 % (ref 40–73)
NRBC # BLD: 0 K/UL (ref 0–0.01)
NRBC BLD-RTO: 0 PER 100 WBC
PLATELET # BLD AUTO: 164 K/UL (ref 135–420)
PMV BLD AUTO: 9.3 FL (ref 9.2–11.8)
POTASSIUM SERPL-SCNC: 3.7 MMOL/L (ref 3.5–5.5)
PROT SERPL-MCNC: 5.8 G/DL (ref 6.4–8.2)
PROTHROMBIN TIME: 13.9 SEC (ref 11.9–14.9)
RBC # BLD AUTO: 4.4 M/UL (ref 4.35–5.65)
SODIUM SERPL-SCNC: 138 MMOL/L (ref 136–145)
TRIGL SERPL-MCNC: 96 MG/DL
TROPONIN I SERPL HS-MCNC: 6 NG/L (ref 0–78)
VLDLC SERPL CALC-MCNC: 19.2 MG/DL
WBC # BLD AUTO: 3.5 K/UL (ref 4.6–13.2)

## 2025-03-24 PROCEDURE — 92526 ORAL FUNCTION THERAPY: CPT

## 2025-03-24 PROCEDURE — 36415 COLL VENOUS BLD VENIPUNCTURE: CPT

## 2025-03-24 PROCEDURE — 94761 N-INVAS EAR/PLS OXIMETRY MLT: CPT

## 2025-03-24 PROCEDURE — 92610 EVALUATE SWALLOWING FUNCTION: CPT

## 2025-03-24 PROCEDURE — 80053 COMPREHEN METABOLIC PANEL: CPT

## 2025-03-24 PROCEDURE — 97162 PT EVAL MOD COMPLEX 30 MIN: CPT

## 2025-03-24 PROCEDURE — 93306 TTE W/DOPPLER COMPLETE: CPT | Performed by: INTERNAL MEDICINE

## 2025-03-24 PROCEDURE — 6370000000 HC RX 637 (ALT 250 FOR IP): Performed by: INTERNAL MEDICINE

## 2025-03-24 PROCEDURE — 97165 OT EVAL LOW COMPLEX 30 MIN: CPT

## 2025-03-24 PROCEDURE — 70551 MRI BRAIN STEM W/O DYE: CPT

## 2025-03-24 PROCEDURE — 1100000003 HC PRIVATE W/ TELEMETRY

## 2025-03-24 PROCEDURE — 6360000002 HC RX W HCPCS: Performed by: PSYCHIATRY & NEUROLOGY

## 2025-03-24 PROCEDURE — 84484 ASSAY OF TROPONIN QUANT: CPT

## 2025-03-24 PROCEDURE — 85610 PROTHROMBIN TIME: CPT

## 2025-03-24 PROCEDURE — 2580000003 HC RX 258: Performed by: INTERNAL MEDICINE

## 2025-03-24 PROCEDURE — 93306 TTE W/DOPPLER COMPLETE: CPT

## 2025-03-24 PROCEDURE — 85025 COMPLETE CBC W/AUTO DIFF WBC: CPT

## 2025-03-24 PROCEDURE — 80061 LIPID PANEL: CPT

## 2025-03-24 PROCEDURE — 6360000002 HC RX W HCPCS: Performed by: FAMILY MEDICINE

## 2025-03-24 RX ORDER — SODIUM CHLORIDE, SODIUM LACTATE, POTASSIUM CHLORIDE, CALCIUM CHLORIDE 600; 310; 30; 20 MG/100ML; MG/100ML; MG/100ML; MG/100ML
INJECTION, SOLUTION INTRAVENOUS CONTINUOUS
Status: DISCONTINUED | OUTPATIENT
Start: 2025-03-24 | End: 2025-03-24 | Stop reason: HOSPADM

## 2025-03-24 RX ORDER — MECLIZINE HCL 12.5 MG 12.5 MG/1
25 TABLET ORAL 3 TIMES DAILY PRN
Status: DISCONTINUED | OUTPATIENT
Start: 2025-03-24 | End: 2025-03-24 | Stop reason: HOSPADM

## 2025-03-24 RX ORDER — CLOPIDOGREL BISULFATE 75 MG/1
75 TABLET ORAL DAILY
Status: DISCONTINUED | OUTPATIENT
Start: 2025-03-24 | End: 2025-03-24 | Stop reason: HOSPADM

## 2025-03-24 RX ORDER — ROSUVASTATIN CALCIUM 10 MG/1
40 TABLET, COATED ORAL NIGHTLY
Status: DISCONTINUED | OUTPATIENT
Start: 2025-03-24 | End: 2025-03-24 | Stop reason: HOSPADM

## 2025-03-24 RX ORDER — PANTOPRAZOLE SODIUM 40 MG/1
40 TABLET, DELAYED RELEASE ORAL
Status: DISCONTINUED | OUTPATIENT
Start: 2025-03-24 | End: 2025-03-24 | Stop reason: HOSPADM

## 2025-03-24 RX ORDER — MECLIZINE HYDROCHLORIDE 25 MG/1
25 TABLET ORAL 3 TIMES DAILY PRN
Qty: 30 TABLET | Refills: 0 | Status: SHIPPED | OUTPATIENT
Start: 2025-03-24 | End: 2025-04-03

## 2025-03-24 RX ORDER — DEXAMETHASONE SODIUM PHOSPHATE 10 MG/ML
6 INJECTION, SOLUTION INTRAMUSCULAR; INTRAVENOUS ONCE
Status: COMPLETED | OUTPATIENT
Start: 2025-03-24 | End: 2025-03-24

## 2025-03-24 RX ORDER — ENOXAPARIN SODIUM 100 MG/ML
40 INJECTION SUBCUTANEOUS DAILY
Status: DISCONTINUED | OUTPATIENT
Start: 2025-03-24 | End: 2025-03-24 | Stop reason: HOSPADM

## 2025-03-24 RX ORDER — ASPIRIN 325 MG
325 TABLET, DELAYED RELEASE (ENTERIC COATED) ORAL DAILY
Status: DISCONTINUED | OUTPATIENT
Start: 2025-03-24 | End: 2025-03-24 | Stop reason: HOSPADM

## 2025-03-24 RX ADMIN — DEXAMETHASONE SODIUM PHOSPHATE 6 MG: 10 INJECTION, SOLUTION INTRAMUSCULAR; INTRAVENOUS at 13:23

## 2025-03-24 RX ADMIN — ASPIRIN 325 MG: 325 TABLET, COATED ORAL at 09:56

## 2025-03-24 RX ADMIN — ENOXAPARIN SODIUM 40 MG: 100 INJECTION SUBCUTANEOUS at 13:23

## 2025-03-24 RX ADMIN — ROSUVASTATIN CALCIUM 40 MG: 10 TABLET, FILM COATED ORAL at 01:06

## 2025-03-24 RX ADMIN — MECLIZINE 25 MG: 12.5 TABLET ORAL at 09:56

## 2025-03-24 RX ADMIN — SODIUM CHLORIDE, SODIUM LACTATE, POTASSIUM CHLORIDE, AND CALCIUM CHLORIDE: .6; .31; .03; .02 INJECTION, SOLUTION INTRAVENOUS at 02:58

## 2025-03-24 RX ADMIN — MECLIZINE 25 MG: 12.5 TABLET ORAL at 01:05

## 2025-03-24 RX ADMIN — CLOPIDOGREL BISULFATE 75 MG: 75 TABLET, FILM COATED ORAL at 09:56

## 2025-03-24 RX ADMIN — PANTOPRAZOLE SODIUM 40 MG: 40 TABLET, DELAYED RELEASE ORAL at 05:55

## 2025-03-24 NOTE — PROGRESS NOTES
Stroke Education provided to patient and the following topics were discussed    1. Patients personal risk factors for stroke are hyperlipidemia, hypertension, and vit D    2. Warning signs of Stroke:        * Sudden numbness or weakness of the face, arm or leg, especially on one side of          The body            * Sudden confusion, trouble speaking or understanding        * Sudden trouble seeing in one or both eyes        * Sudden trouble walking, dizziness, loss of balance or coordination        * Sudden severe headache with no known cause      3. Importance of activation Emergency Medical Services ( 9-1-1 ) immediately if experience any warning signs of stroke.    4. Be sure and schedule a follow-up appointment with your primary care doctor or any specialists as instructed.     5. You must take medicine every day to treat your risk factors for stroke.  Be sure to take your medicines exactly as your doctor tells you: no more, no less.  Know what your medicines are for , what they do.  Anti-thrombotics /anticoagulants can help prevent strokes.  You are taking the following medicine(s)  see mar     6.  Smoking and second-hand smoke greatly increase your risk of stroke, cardiovascular disease and death. Smoking history never    7. Information provided was [facility] Stroke Education Binder    8. Documentation of teaching completed in Patient Education Activity and on Care Plan with teaching response noted?  yes

## 2025-03-24 NOTE — ED NOTES
Pt resting in bed. Pt requested cold water. Ok'd by MD.     Pt given ice water. Swallow screen passed.

## 2025-03-24 NOTE — CARE COORDINATION
Met with Patient at bedside. CM introduces self and explains role. Patient is alert and oriented x 4. HIPAA verified. Patients' demographics verified and updated accordingly. Patient agrees to complete assessment.    Anticipate patient discharging today if Echo is negative.     DCP: Home with self- care and supportive Spouse, no new DME indicated, Patients vehicle is in [facility] parking lot, he will transport himself home.      03/24/25 1038   Service Assessment   Patient Orientation Alert and Oriented;Person;Place;Situation;Self   Cognition Alert   History Provided By Patient   Primary Caregiver Self   Accompanied By/Relationship SELF   Support Systems Spouse/Significant Other   Patient's Healthcare Decision Maker is:   (Patient states he has a living on file with [facility], encouraged to bring a copy in to his next visit at [facility] ,Patient agrees.)   PCP Verified by CM Yes   Last Visit to PCP Within last 3 months   Prior Functional Level Independent in ADLs/IADLs   Current Functional Level Independent in ADLs/IADLs   Can patient return to prior living arrangement Yes   Ability to make needs known: Good   Family able to assist with home care needs: Yes   Would you like for me to discuss the discharge plan with any other family members/significant others, and if so, who? No   Financial Resources Other (Comment)  [place]   Community Resources None   Social/Functional History   Lives With Spouse   Type of Home House   Home Layout Multi-level   Home Access Stairs to enter with rails   Entrance Stairs - Number of Steps 4   Entrance Stairs - Rails Both   Bathroom Shower/Tub Walk-in shower   Bathroom Toilet Standard   Bathroom Equipment None   Bathroom Accessibility Accessible   Home Equipment None   Receives Help From Family   Prior Level of Assist for ADLs Independent   Prior Level of Assist for Homemaking Independent   Homemaking Responsibilities Yes   Meal Prep Responsibility Primary   Laundry

## 2025-03-24 NOTE — ED PROVIDER NOTES
EMERGENCY DEPARTMENT HISTORY AND PHYSICAL EXAM      Patient Name: Benjamin Jacobson  MRN: [id number]  YOB: 1975  Provider: Dorothea Cervantes MD  PCP: Phillip Alexander MD   Time/Date of evaluation: 9:43 PM EDT on 3/23/25    History of Presenting Illness     Chief Complaint   Patient presents with    Dizziness       History Provided By: Patient     History (Narative):   Benjamin Jacobson is a 49 y.o. male with a PMHX of right sided PICA stroke in 2016 as well as all of the other medical problems listed below  who presents to the emergency department  by POV C/O dizziness and concern for stroke.  The patient's CVA in 2016 started out as vertiginous symptoms.  He is having the same symptoms today.  He was walking and they came on suddenly and they do not extinguish.  He feels like he is spinning.  He denies any other symptoms.        Past History     Past Medical History:  Past Medical History:   Diagnosis Date    Acute sinusitis, unspecified     Acute upper respiratory infections of unspecified site     Asthma     Calculus of kidney     Epididymal cyst     Herpes simplex without mention of complication     History of herpes genitalis     Hypertension     Hypogonadism in male     Insomnia, unspecified     Lesion of plantar nerve     Mixed hypertriglyceridemia     Morbid obesity     Obesity     Osteoarthritis     Other malaise and fatigue     Pain in joint, lower leg     Scrotal varices     Sleep apnea     Spermatocele     Tachycardia, unspecified     Testicular hypofunction     Unspecified arthropathy, lower leg     Unspecified essential hypertension     Varicocele     small, Left    Vitamin D deficiency        Past Surgical History:  Past Surgical History:   Procedure Laterality Date    CHOLECYSTECTOMY  2005    COLONOSCOPY      GASTRECTOMY  08/24/2015    Gastric Sleeve    OTHER SURGICAL HISTORY  08/24/2015    EGD    SEPTOPLASTY  2003    TONSILLECTOMY  2003       Family History:  Family History   Problem Relation Age of 
80.7

## 2025-03-24 NOTE — PROGRESS NOTES
MRI screening form needs to be filled out and faxed to [phone] BEFORE MRI can be scheduled.  If unable to obtain information from patient , [employer] needs to be contacted . If patient is claustrophobic or will needs pain meds, please have ordered in advance in order to facilitate exam.

## 2025-03-24 NOTE — PROGRESS NOTES
4 Eyes Skin Assessment     NAME:  Benjamin Jacobson  YOB: 1975  MEDICAL RECORD NUMBER:  [id number]    The patient is being assessed for  Admission    I agree that at least one RN has performed a thorough Head to Toe Skin Assessment on the patient. ALL assessment sites listed below have been assessed.      Areas assessed by both nurses:    Head, Face, Ears, Shoulders, Back, Chest, Arms, Elbows, Hands, Sacrum. Buttock, Coccyx, Ischium, Legs. Feet and Heels, and Under Medical Devices         Does the Patient have a Wound? No noted wound(s)       Alonso Prevention initiated by RN: No  Wound Care Orders initiated by RN: No    Pressure Injury (Stage 3,4, Unstageable, DTI, NWPT, and Complex wounds) if present, place Wound referral order by RN under : No    New Ostomies, if present place, Ostomy referral order under : No     Nurse 1 eSignature: Electronically signed by Niyah Reddy RN on 3/24/25 at 6:56 AM EDT    **SHARE this note so that the co-signing nurse can place an eSignature**    Nurse 2 eSignature: Electronically signed by lAexa steele RN on 3/24/25 at 6:56 AM EDT

## 2025-03-24 NOTE — PLAN OF CARE
Problem: SLP Adult - Impaired Swallowing  Goal: By Discharge: Advance to least restrictive diet without signs or symptoms of aspiration for planned discharge setting.  See evaluation for individualized goals.  Description: Patient will:  1. Tolerate PO trials with 0 s/s overt distress in 4/5 trials  2. Utilize compensatory swallow strategies/maneuvers (decrease bite/sip, size/rate, alt. liq/sol) with min cues in 4/5 trials    Rec:     Regular diet with thin liquids  Aspiration precautions  HOB >45 during po intake, remain >30 for 30-45 minutes after po   Small bites/sips; alternate liquid/solid with slow feeding rate   Oral care TID  Meds per pt preference  Outcome: Progressing   SPEECH LANGUAGE PATHOLOGY BEDSIDE SWALLOW EVALUATION/TREATMENT    Patient: Benjamin Jacobson (49 y.o. male)  Date: 3/24/2025  Primary Diagnosis: Vertigo [R42]  Suspected cerebrovascular accident (CVA) [R09.89]  Cerebrovascular accident (CVA), unspecified mechanism (HCC) [I63.9]       Precautions: Aspiration  PLOF: As per H&P  ASSESSMENT:  Based on the objective data described below, the patient presents with oropharyngeal swallow fxn essentially WFL. Strength, ROM, and coordination of the orofacial musculature were all found to be WFL. Pt tolerated reg solid, puree, and thin liquids +/- straw consecutive swallows without any overt s/sx of aspiration. Mastication was appropriate with timely a-p transit. Positive oral clearance observed across all trials. Laryngeal elevation was functional/timely to palpation with all PO trials. Speech production was fluent; no dysarthria observed. Expressive/receptive speech production appeared WFL to informal observation. Pt communicated in sentences of appropriate form, content, and use. Social conversation appropriate. Pt safe for continuation of reg solid diet with thin liquids, aspiration precautions, oral care TID, and meds as tolerated.     TREATMENT:  Skilled therapy initiated; Educated patient on

## 2025-03-24 NOTE — CONSULTS
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  He is lacking features of recurrent cerebellar stroke.    Plan:  MRI of the brain  Decadron 6 mg IV  Continue Plavix if MRI is positive for stroke  Prednisone 20 mg daily for 3 days if stroke is ruled out    HPI:  This is a 49-year-old man with a past history of a cerebellar stroke in 2018.  He reports at that time he got a severe headache with nausea and vomiting.  He recovered in a matter of days.  He has been on aspirin since then.  He reports that just prior to admission he awoke with vertigo.  It seemed to be brought on by standing up.  It was not associated with any headache, tinnitus, hearing loss, nausea, vomiting or significant headache.  He has been taking meclizine, which gives him some relief.  He had a CT of the brain which showed some chronic areas of ischemia in the cerebellum.  CT angiogram showed no large vessel occlusion or stenosis.  EKG shows normal sinus rhythm.  His lab work is unremarkable.  He reports he is feeling somewhat better today.  He denies any prodromal sinus congestion or head trauma.    PMH: Stroke    Medication list was reviewed.    Family history is noncontributory.    10 system review of systems is otherwise negative.    Physical exam:  Generally: Well-nourished in no acute distress.  HEENT: Pupils equal and reactive.  Conjunctiva clear.  Oropharynx clear.  Neck: Supple without lymphadenopathy.  Cardiovascular: Regular rate and rhythm.  Lungs: Clear to auscultation  Abdomen: Soft and nontender.  Skin: No rash  Extremities: No cyanosis clubbing or edema  Neurologic exam:  Alert and oriented x3.  Speech is fluent and clear.  Follows commands appropriately.  Cranial nerves II through XII are intact.  Motor exam shows good strength bilaterally without fix or drift.  Sensation is intact to light touch bilaterally.  Cerebellar testing shows normal finger-to-nose movements.  No abnormal movements were seen.  Reflexes were symmetric and

## 2025-03-24 NOTE — ED TRIAGE NOTES
Pt arrives ambulatory to triage reports dizziness since 1400, reports feeling like the room is spinning  Denies any other symptoms   Reports hx of stroke in 2016

## 2025-03-24 NOTE — DISCHARGE INSTRUCTIONS
Patient armband removed and shredded  Thank you for enrolling in [facility] [employer]. Please follow the instructions below to securely access your online medical record. [facility] [employer] allows you to send messages to your doctor, view your test results, renew your prescriptions, schedule appointments, and more.     How Do I Sign Up?  In your Internet browser, go to [email or web address]  Click on the Sign Up Now link in the Sign In box. You will see the New Member Sign Up page.  Enter your [facility] [employer] [facility] [employer] Access Code exactly as it appears below. You will not need to use this code after you’ve completed the sign-up process. If you do not sign up before the expiration date, you must request a new code.  [facility] [employer] Access Code: Activation code not generated  Current [facility] [employer] Status: Active    Enter your Social Security Number ([id number]) and Date of Birth (mm/dd/yyyy) as indicated and click Submit. You will be taken to the next sign-up page.  Create a [facility] [employer] ID. This will be your [facility] [employer] login ID and cannot be changed, so think of one that is secure and easy to remember.  Create a [facility] [employer] password. You can change your password at any time.  Enter your Password Reset Question and Answer. This can be used at a later time if you forget your password.   Enter your e-mail address. You will receive e-mail notification when new information is available in [facility] [employer].  Click Sign Up. You can now view your medical record.     Additional Information  If you have questions, please contact your physician practice where you receive care. Remember, [employer] is NOT to be used for urgent needs. For medical emergencies, dial 911.  .dis  DISCHARGE SUMMARY from Nurse    PATIENT INSTRUCTIONS:    After general anesthesia or intravenous sedation, for 24 hours or while taking prescription Narcotics:  Limit your activities  Do not drive and operate hazardous machinery  Do not make important personal or business decisions  Do  not drink alcoholic

## 2025-03-24 NOTE — PROGRESS NOTES
PHYSICAL THERAPY EVALUATION/DISCHARGE    Patient: Benjamin Jacobson (49 y.o. male)  Date: 3/24/2025  Primary Diagnosis: Vertigo [R42]  Suspected cerebrovascular accident (CVA) [R09.89]  Cerebrovascular accident (CVA), unspecified mechanism (HCC) [I63.9]  Precautions: None  PLOF: Independent   ASSESSMENT AND RECOMMENDATIONS:  Reports up independently in room. Denies mobility concerns. Independently for supine to sit and sit to stand. Amb 25ft in room with steady gait. Performed orthostatic vitals; negative. Returned to seated in bed. Call bell in reach.     Orthostatic vitals  /68, HR 76 supine  /66 HR 73 seated  /73 HR 79 standing    Patient does not require further skilled physical therapy intervention at this level of care.    Further Equipment Recommendations for Discharge: none    AM-PAC Inpatient Mobility Raw Score : 24    This [facility] score should be considered in conjunction with interdisciplinary team recommendations to determine the most appropriate discharge setting. Patient's social support, diagnosis, medical stability, and prior level of function should also be taken into consideration.    Current research shows that an AM-PAC score of 18 (14 without stairs) or greater is associated with a discharge to the patient's home setting.     SUBJECTIVE:   Patient stated “I have been getting up.”    OBJECTIVE DATA SUMMARY:     Past Medical History:   Diagnosis Date    Acute sinusitis, unspecified     Acute upper respiratory infections of unspecified site     Asthma     Calculus of kidney     Epididymal cyst     Herpes simplex without mention of complication     History of herpes genitalis     Hypertension     Hypogonadism in male     Insomnia, unspecified     Lesion of plantar nerve     Mixed hypertriglyceridemia     Morbid obesity     Obesity     Osteoarthritis     Other malaise and fatigue     Pain in joint, lower leg     Scrotal varices     Sleep apnea     Spermatocele     Tachycardia, unspecified

## 2025-03-24 NOTE — PROGRESS NOTES
MRI screening form needs to be filled out and faxed to [phone] BEFORE MRI can be scheduled.  If unable to obtain information from patient , [employer] needs to be contacted . If patient is claustrophobic or will needs pain meds, please have ordered in advance in order to facilitate exam.

## 2025-03-24 NOTE — H&P
HISTORY & PHYSICAL      Patient: Benjamin Jacobson MRN: [id number]  CSN: [id number]    YOB: 1975  Age: 49 y.o.  Sex: male    DOA: 3/23/2025 LOS:  LOS: 0 days        DOA: 3/23/2025        Assessment/Plan     49 years old presented with dizziness and had a spinning sensation since 2 PM  Symptoms could be due to vertigo, versus cerebrovascular event  The patient admitted to [facility] telemetry  Requested MRI of the brain  Continue home aspirin and add Plavix  Continue Crestor  Permissive hypertension  PT OT and speech therapy consults  Echocardiogram  Neurochecks  Clinical monitoring    Asthma, stable  Bronchodilators if needed    Other medical problems as below  Continue home meds and outpatient follow-up    Admission plan was discussed    Patient Active Problem List   Diagnosis    Spermatocele    Varicocele    Hypogonadism male         History of Presenting Illness:  49 years old male who is admitted for further evaluation of possible CVA.  He presented to ED with complaint of having dizziness and had a spinning sensation since 2 PM.  No speech, or visual complaints, and no weakness or numbness.  He has medical history significant for asthma, hypertension and history of right sided PICA stroke in 2016.  Code stroke was evaluated in ED, patient evaluated by teleneurology, and had CT scan of the head that showed cerebellar hypodensities of indeterminate age.  CTA of the head and neck showed no LVO.  Neurology recommended admission for MRI of the brain and further monitoring.  Patient was taking aspirin prior to admission, and was not on  Plavix.  The patient has no other concerns.      Past Medical History:   Diagnosis Date    Acute sinusitis, unspecified     Acute upper respiratory infections of unspecified site     Asthma     Calculus of kidney     Epididymal cyst     Herpes simplex without mention of complication     History of herpes genitalis     Hypertension     Hypogonadism in male     Insomnia,

## 2025-03-24 NOTE — PROGRESS NOTES
OCCUPATIONAL THERAPY EVALUATION/DISCHARGE    Patient: Benjamin Jacobson (49 y.o. male)  Date: 3/24/2025  Primary Diagnosis: Vertigo [R42]  Suspected cerebrovascular accident (CVA) [R09.89]  Cerebrovascular accident (CVA), unspecified mechanism (HCC) [I63.9]  Precautions: General Precautions  PLOF: Pt was independent with self-care and functional mobility.      ASSESSMENT AND RECOMMENDATIONS:  Based on the objective data described below, pt presents with no deficits that impede pt function with ADLs, functional transfers, and functional mobility in preparation for selfcare tasks. Patient with improvement in dizziness noted from admission, no LOB noted during standing ADLs.  Pt with no self-care concerns at this time and left all needs met and call bell in reach.      Maximum therapeutic gains met at current level of care and patient will be discharged from occupational therapy at this time.    Further Equipment Recommendations for Discharge: None    AMPAC: AM-PAC Inpatient Daily Activity Raw Score: 24    At this time and based on an AM-PAC score, no further OT is recommended upon discharge.  Recommend patient returns to prior setting with prior services.    This AMPA score should be considered in conjunction with interdisciplinary team recommendations to determine the most appropriate discharge setting. Patient's social support, diagnosis, medical stability, and prior level of function should also be taken into consideration.     SUBJECTIVE:   Patient stated “The antivert seemed to help”    OBJECTIVE DATA SUMMARY:     Past Medical History:   Diagnosis Date    Acute sinusitis, unspecified     Acute upper respiratory infections of unspecified site     Asthma     Calculus of kidney     Epididymal cyst     Herpes simplex without mention of complication     History of herpes genitalis     Hypertension     Hypogonadism in male     Insomnia, unspecified     Lesion of plantar nerve     Mixed hypertriglyceridemia     Morbid

## 2025-03-25 LAB
EKG ATRIAL RATE: 64 BPM
EKG DIAGNOSIS: NORMAL
EKG P AXIS: 53 DEGREES
EKG P-R INTERVAL: 164 MS
EKG Q-T INTERVAL: 380 MS
EKG QRS DURATION: 90 MS
EKG QTC CALCULATION (BAZETT): 392 MS
EKG R AXIS: 31 DEGREES
EKG T AXIS: 32 DEGREES
EKG VENTRICULAR RATE: 64 BPM

## 2025-03-25 PROCEDURE — 93010 ELECTROCARDIOGRAM REPORT: CPT | Performed by: INTERNAL MEDICINE
